# Patient Record
Sex: FEMALE | Race: WHITE | NOT HISPANIC OR LATINO
[De-identification: names, ages, dates, MRNs, and addresses within clinical notes are randomized per-mention and may not be internally consistent; named-entity substitution may affect disease eponyms.]

---

## 2017-01-03 ENCOUNTER — HOSPITAL (OUTPATIENT)
Dept: OTHER | Age: 22
End: 2017-01-03

## 2017-01-03 LAB
ALBUMIN SERPL-MCNC: 3.9 GM/DL (ref 3.6–5.1)
ALBUMIN/GLOB SERPL: 1.2 {RATIO} (ref 1–2.4)
ALP SERPL-CCNC: 98 UNIT/L (ref 45–117)
ALT SERPL-CCNC: 31 UNIT/L
AMORPH SED URNS QL MICRO: ABNORMAL
ANALYZER ANC (IANC): NORMAL
ANION GAP SERPL CALC-SCNC: 14 MMOL/L (ref 10–20)
APPEARANCE UR: CLEAR
AST SERPL-CCNC: 24 UNIT/L
BACTERIA #/AREA URNS HPF: ABNORMAL /HPF
BASOPHILS # BLD: 0.1 THOUSAND/MCL (ref 0–0.3)
BASOPHILS NFR BLD: 2 %
BILIRUB SERPL-MCNC: 0.6 MG/DL (ref 0.2–1)
BILIRUB UR QL: NEGATIVE
BUN SERPL-MCNC: 8 MG/DL (ref 10–20)
BUN/CREAT SERPL: 10 (ref 7–25)
CALCIUM SERPL-MCNC: 8 MG/DL (ref 8.4–10.2)
CAOX CRY URNS QL MICRO: ABNORMAL
CHLORIDE: 104 MMOL/L (ref 98–107)
CO2 SERPL-SCNC: 27 MMOL/L (ref 21–32)
COLOR UR: YELLOW
CREAT SERPL-MCNC: 0.77 MG/DL (ref 0.51–0.95)
DIFFERENTIAL METHOD BLD: NORMAL
EOSINOPHIL # BLD: 0.1 THOUSAND/MCL (ref 0.1–0.5)
EOSINOPHIL NFR BLD: 1 %
EPITH CASTS #/AREA URNS LPF: ABNORMAL /[LPF]
ERYTHROCYTE [DISTWIDTH] IN BLOOD: 12.9 % (ref 11–15)
FATTY CASTS #/AREA URNS LPF: ABNORMAL /[LPF]
GLOBULIN SER-MCNC: 3.2 GM/DL (ref 2–4)
GLUCOSE SERPL-MCNC: 90 MG/DL (ref 65–99)
GLUCOSE UR-MCNC: NEGATIVE MG/DL
GRAN CASTS #/AREA URNS LPF: ABNORMAL /[LPF]
HEMATOCRIT: 39.5 % (ref 36–46.5)
HGB BLD-MCNC: 13.3 GM/DL (ref 12–15.5)
HGB UR QL: ABNORMAL
HYALINE CASTS #/AREA URNS LPF: ABNORMAL /[LPF]
KETONES UR-MCNC: NEGATIVE MG/DL
LEUKOCYTE ESTERASE UR QL STRIP: NEGATIVE
LYMPHOCYTES # BLD: 1.5 THOUSAND/MCL (ref 1–4.8)
LYMPHOCYTES NFR BLD: 31 %
MCH RBC QN AUTO: 29.2 PG (ref 26–34)
MCHC RBC AUTO-ENTMCNC: 33.7 GM/DL (ref 32–36.5)
MCV RBC AUTO: 86.6 FL (ref 78–100)
MICROSCOPIC (MT): ABNORMAL
MIXED CELL CASTS #/AREA URNS LPF: ABNORMAL /[LPF]
MONOCYTES # BLD: 0.3 THOUSAND/MCL (ref 0.3–0.9)
MONOCYTES NFR BLD: 7 %
MUCOUS THREADS URNS QL MICRO: PRESENT
NEUTROPHILS # BLD: 2.8 THOUSAND/MCL (ref 1.8–7.7)
NEUTROPHILS NFR BLD: 59 %
NEUTS SEG NFR BLD: NORMAL %
NITRITE UR QL: NEGATIVE
PERCENT NRBC: NORMAL
PH UR: 5 UNIT (ref 5–7)
PLATELET # BLD: 154 THOUSAND/MCL (ref 140–450)
POTASSIUM SERPL-SCNC: 3.7 MMOL/L (ref 3.4–5.1)
PROT SERPL-MCNC: 7.1 GM/DL (ref 6.4–8.2)
PROT UR QL: NEGATIVE MG/DL
RBC # BLD: 4.56 MILLION/MCL (ref 4–5.2)
RBC #/AREA URNS HPF: ABNORMAL /HPF (ref 0–3)
RBC CASTS #/AREA URNS LPF: ABNORMAL /[LPF]
RENAL EPI CELLS #/AREA URNS HPF: ABNORMAL /[HPF]
SODIUM SERPL-SCNC: 141 MMOL/L (ref 135–145)
SP GR UR: 1.02 (ref 1–1.03)
SPECIMEN SOURCE: ABNORMAL
SPERM URNS QL MICRO: ABNORMAL
SQUAMOUS #/AREA URNS HPF: ABNORMAL /HPF (ref 0–5)
T VAGINALIS URNS QL MICRO: ABNORMAL
TRI-PHOS CRY URNS QL MICRO: ABNORMAL
URATE CRY URNS QL MICRO: ABNORMAL
URNS CMNT MICRO: ABNORMAL
UROBILINOGEN UR QL: 0.2 MG/DL (ref 0–1)
WAXY CASTS #/AREA URNS LPF: ABNORMAL /[LPF]
WBC # BLD: 4.7 THOUSAND/MCL (ref 4.2–11)
WBC #/AREA URNS HPF: ABNORMAL /HPF (ref 0–5)
WBC CASTS #/AREA URNS LPF: ABNORMAL /[LPF]
YEAST HYPHAE URNS QL MICRO: ABNORMAL
YEAST URNS QL MICRO: ABNORMAL

## 2017-01-26 PROCEDURE — 87491 CHLMYD TRACH DNA AMP PROBE: CPT | Performed by: OBSTETRICS & GYNECOLOGY

## 2017-01-26 PROCEDURE — 88175 CYTOPATH C/V AUTO FLUID REDO: CPT | Performed by: OBSTETRICS & GYNECOLOGY

## 2017-01-26 PROCEDURE — 87591 N.GONORRHOEAE DNA AMP PROB: CPT | Performed by: OBSTETRICS & GYNECOLOGY

## 2017-05-30 ENCOUNTER — OFFICE VISIT (OUTPATIENT)
Dept: OCCUPATIONAL MEDICINE | Age: 22
End: 2017-05-30
Attending: PHYSICIAN ASSISTANT

## 2017-11-01 PROBLEM — S46.011A STRAIN OF RIGHT ROTATOR CUFF CAPSULE, INITIAL ENCOUNTER: Status: ACTIVE | Noted: 2017-11-01

## 2018-01-31 PROCEDURE — 88175 CYTOPATH C/V AUTO FLUID REDO: CPT | Performed by: OBSTETRICS & GYNECOLOGY

## 2019-02-06 PROCEDURE — 88175 CYTOPATH C/V AUTO FLUID REDO: CPT | Performed by: OBSTETRICS & GYNECOLOGY

## 2019-04-08 ENCOUNTER — APPOINTMENT (OUTPATIENT)
Dept: GENERAL RADIOLOGY | Age: 24
End: 2019-04-08
Attending: NURSE PRACTITIONER
Payer: COMMERCIAL

## 2019-04-08 ENCOUNTER — HOSPITAL ENCOUNTER (OUTPATIENT)
Age: 24
Discharge: HOME | End: 2019-04-08
Payer: COMMERCIAL

## 2019-04-08 VITALS
HEART RATE: 73 BPM | BODY MASS INDEX: 25.06 KG/M2 | HEIGHT: 72 IN | DIASTOLIC BLOOD PRESSURE: 52 MMHG | OXYGEN SATURATION: 100 % | SYSTOLIC BLOOD PRESSURE: 122 MMHG | RESPIRATION RATE: 18 BRPM | TEMPERATURE: 98 F | WEIGHT: 185 LBS

## 2019-04-08 DIAGNOSIS — K59.00 CONSTIPATION, UNSPECIFIED CONSTIPATION TYPE: Primary | ICD-10-CM

## 2019-04-08 PROCEDURE — 74019 RADEX ABDOMEN 2 VIEWS: CPT | Performed by: NURSE PRACTITIONER

## 2019-04-08 PROCEDURE — 99214 OFFICE O/P EST MOD 30 MIN: CPT

## 2019-04-08 PROCEDURE — 87086 URINE CULTURE/COLONY COUNT: CPT | Performed by: NURSE PRACTITIONER

## 2019-04-08 PROCEDURE — 99203 OFFICE O/P NEW LOW 30 MIN: CPT

## 2019-04-08 PROCEDURE — 81002 URINALYSIS NONAUTO W/O SCOPE: CPT | Performed by: NURSE PRACTITIONER

## 2019-04-08 PROCEDURE — 81025 URINE PREGNANCY TEST: CPT | Performed by: NURSE PRACTITIONER

## 2019-04-08 RX ORDER — MAGNESIUM CITRATE
296 SOLUTION, ORAL ORAL DAILY
Qty: 2072 ML | Refills: 0 | Status: SHIPPED | OUTPATIENT
Start: 2019-04-08 | End: 2019-04-15

## 2019-04-08 NOTE — ED INITIAL ASSESSMENT (HPI)
Patient states here for constipation.   States took suppository on Sunday- and last BM one week ago  Denies any fever  Denies any diarrhea, emesis or nausea    Tolerates food and fluids

## 2019-04-10 NOTE — ED NOTES
Call placed to pt. Pt notified of negative lab result, no change in treatment plan. Thanked pt for using our services. Pt denies any further questions or concerns.

## 2020-12-09 ENCOUNTER — WEB ENCOUNTER (OUTPATIENT)
Dept: URBAN - NONMETROPOLITAN AREA CLINIC 2 | Facility: CLINIC | Age: 25
End: 2020-12-09

## 2020-12-09 ENCOUNTER — OFFICE VISIT (OUTPATIENT)
Dept: URBAN - NONMETROPOLITAN AREA CLINIC 2 | Facility: CLINIC | Age: 25
End: 2020-12-09

## 2020-12-09 ENCOUNTER — DASHBOARD ENCOUNTERS (OUTPATIENT)
Age: 25
End: 2020-12-09

## 2020-12-09 VITALS
BODY MASS INDEX: 25.06 KG/M2 | DIASTOLIC BLOOD PRESSURE: 67 MMHG | HEART RATE: 65 BPM | HEIGHT: 72 IN | SYSTOLIC BLOOD PRESSURE: 114 MMHG | WEIGHT: 185 LBS

## 2020-12-09 DIAGNOSIS — R19.8 ALTERNATING CONSTIPATION AND DIARRHEA: ICD-10-CM

## 2020-12-09 PROCEDURE — G8427 DOCREV CUR MEDS BY ELIG CLIN: HCPCS | Performed by: INTERNAL MEDICINE

## 2020-12-09 PROCEDURE — 99204 OFFICE O/P NEW MOD 45 MIN: CPT | Performed by: INTERNAL MEDICINE

## 2020-12-09 PROCEDURE — G8420 CALC BMI NORM PARAMETERS: HCPCS | Performed by: INTERNAL MEDICINE

## 2020-12-09 PROCEDURE — G9903 PT SCRN TBCO ID AS NON USER: HCPCS | Performed by: INTERNAL MEDICINE

## 2020-12-09 PROCEDURE — 1036F TOBACCO NON-USER: CPT | Performed by: INTERNAL MEDICINE

## 2020-12-09 RX ORDER — DICYCLOMINE HYDROCHLORIDE 10 MG/1
1 TABLET AS NEEDED FOR CRAMPING/DIARRHEA CAPSULE ORAL THREE TIMES A DAY
Qty: 90 TABLET | Refills: 6 | OUTPATIENT
Start: 2020-12-09 | End: 2021-07-07

## 2020-12-09 RX ORDER — POLYETHYLENE GLYCOL 3350 17 G/17G
1 PACKET MIXED WITH 8 OUNCES OF FLUID POWDER, FOR SOLUTION ORAL
Qty: 1 BOTTLE | Refills: 3 | OUTPATIENT
Start: 2020-12-09 | End: 2021-04-08

## 2020-12-09 NOTE — HPI-TODAY'S VISIT:
Juli Kohler is a 25 year old female here for alternating bowel habits and now her hair is falling out. She started having bowel issues in high school with constpation for a week then diarrhea for a week. This has gotten worse as she has gotten older. Over the last year, she will have severe abdominal pain with bloating when constipated. She has been to urgent care with an x-ray that shows stool. She is told to eat more fiber. She is a farmer and a vegan. She only eats fiber. She has started having her hair fall out. She is concerned she is not absorbing. She has not had any blood work in 5 years. She has tried mag citrate and senna with no change. She waits to take anything until she has not had a BM for 5 days. CS

## 2020-12-11 LAB
A/G RATIO: 2
ALBUMIN: 4.5
ALKALINE PHOSPHATASE: 72
ALT (SGPT): 23
AST (SGOT): 15
BASO (ABSOLUTE): 0
BASOS: 1
BILIRUBIN, TOTAL: 0.5
BUN/CREATININE RATIO: 13
BUN: 11
C-REACTIVE PROTEIN, QUANT: 1
CALCIUM: 9.3
CARBON DIOXIDE, TOTAL: 24
CHLORIDE: 103
CREATININE: 0.82
DEAMIDATED GLIADIN ABS, IGA: 4
DEAMIDATED GLIADIN ABS, IGG: 3
EGFR IF AFRICN AM: 113
EGFR IF NONAFRICN AM: 98
ENDOMYSIAL ANTIBODY IGA: NEGATIVE
EOS (ABSOLUTE): 0.1
EOS: 1
GLOBULIN, TOTAL: 2.3
GLUCOSE: 79
HEMATOCRIT: 41.8
HEMATOLOGY COMMENTS:: (no result)
HEMOGLOBIN: 13.6
IMMATURE CELLS: (no result)
IMMATURE GRANS (ABS): 0
IMMATURE GRANULOCYTES: 0
IMMUNOGLOBULIN A, QN, SERUM: 172
IRON BIND.CAP.(TIBC): 311
IRON SATURATION: 45
IRON: 140
LYMPHS (ABSOLUTE): 1.8
LYMPHS: 30
MCH: 30.3
MCHC: 32.5
MCV: 93
MONOCYTES(ABSOLUTE): 0.4
MONOCYTES: 7
NEUTROPHILS (ABSOLUTE): 3.8
NEUTROPHILS: 61
NRBC: (no result)
PLATELETS: 273
POTASSIUM: 4.2
PROTEIN, TOTAL: 6.8
RBC: 4.49
RDW: 12.5
SEDIMENTATION RATE-WESTERGREN: 2
SODIUM: 139
T-TRANSGLUTAMINASE (TTG) IGA: <2
T-TRANSGLUTAMINASE (TTG) IGG: 6
TSH: 1.51
UIBC: 171
WBC: 6.1

## 2021-01-11 ENCOUNTER — OFFICE VISIT (OUTPATIENT)
Dept: URBAN - NONMETROPOLITAN AREA CLINIC 2 | Facility: CLINIC | Age: 26
End: 2021-01-11

## 2021-07-21 NOTE — ED PROVIDER NOTES
Blood sugar is 107. PT is 13.6. INR 1.1. Patient Seen in: [facility] In [place]    History   Patient presents with:  Abdominal Pain    Stated Complaint: constipation/abd pain    61-year-old female who presents to the immediate care with complaints of constipation on and off for t Pulse 73   Resp 18   Temp 98.2 °F (36.8 °C)   Temp src Oral   SpO2 100 %   O2 Device None (Room air)       Current:/52   Pulse 73   Temp 98.2 °F (36.8 °C) (Oral)   Resp 18   Ht 188 cm (6' 2\")   Wt 83.9 kg   LMP 03/01/2019   SpO2 100%   BMI 23.75 k CONCLUSION:    Large amount of stool throughout the colon. Mild gas in the GI tract. No sign of small bowel obstruction. Mild gas in the stomach. No suspicious calcification or sign of organomegaly.   No sign of lower lobe pneumonia or acute bony ab